# Patient Record
Sex: MALE | Race: WHITE | NOT HISPANIC OR LATINO | Employment: FULL TIME | URBAN - NONMETROPOLITAN AREA
[De-identification: names, ages, dates, MRNs, and addresses within clinical notes are randomized per-mention and may not be internally consistent; named-entity substitution may affect disease eponyms.]

---

## 2023-05-22 DIAGNOSIS — F32.A DEPRESSION, UNSPECIFIED DEPRESSION TYPE: Primary | ICD-10-CM

## 2023-05-22 RX ORDER — FLUOXETINE HYDROCHLORIDE 20 MG/1
CAPSULE ORAL
Qty: 60 CAPSULE | Refills: 3 | Status: SHIPPED | OUTPATIENT
Start: 2023-05-22

## 2023-05-22 RX ORDER — FLUOXETINE HYDROCHLORIDE 20 MG/1
40 CAPSULE ORAL EVERY MORNING
COMMUNITY
Start: 2023-04-18 | End: 2023-07-06

## 2023-07-06 ENCOUNTER — OFFICE VISIT (OUTPATIENT)
Dept: FAMILY MEDICINE | Facility: CLINIC | Age: 23
End: 2023-07-06
Payer: COMMERCIAL

## 2023-07-06 VITALS
DIASTOLIC BLOOD PRESSURE: 74 MMHG | HEIGHT: 72 IN | TEMPERATURE: 99 F | HEART RATE: 85 BPM | OXYGEN SATURATION: 99 % | BODY MASS INDEX: 31.97 KG/M2 | SYSTOLIC BLOOD PRESSURE: 120 MMHG | WEIGHT: 236 LBS

## 2023-07-06 DIAGNOSIS — F41.9 ANXIETY: Primary | ICD-10-CM

## 2023-07-06 DIAGNOSIS — R53.83 FATIGUE, UNSPECIFIED TYPE: ICD-10-CM

## 2023-07-06 LAB
ALBUMIN SERPL-MCNC: 5.1 G/DL (ref 3.4–5)
ALBUMIN/GLOB SERPL: 1.9 RATIO
ALP SERPL-CCNC: 83 UNIT/L (ref 50–144)
ALT SERPL-CCNC: 110 UNIT/L (ref 1–45)
ANION GAP SERPL CALC-SCNC: 7 MEQ/L (ref 2–13)
AST SERPL-CCNC: 66 UNIT/L (ref 17–59)
BASOPHILS # BLD AUTO: 0.04 X10(3)/MCL (ref 0.01–0.08)
BASOPHILS NFR BLD AUTO: 0.7 % (ref 0.1–1.2)
BILIRUBIN DIRECT+TOT PNL SERPL-MCNC: 0.4 MG/DL (ref 0–1)
BUN SERPL-MCNC: 11 MG/DL (ref 7–20)
CALCIUM SERPL-MCNC: 9.8 MG/DL (ref 8.4–10.2)
CHLORIDE SERPL-SCNC: 102 MMOL/L (ref 98–110)
CHOLEST SERPL-MCNC: 182 MG/DL (ref 0–200)
CO2 SERPL-SCNC: 33 MMOL/L (ref 21–32)
CREAT SERPL-MCNC: 0.9 MG/DL (ref 0.66–1.25)
CREAT/UREA NIT SERPL: 12 (ref 12–20)
EOSINOPHIL # BLD AUTO: 0.09 X10(3)/MCL (ref 0.04–0.54)
EOSINOPHIL NFR BLD AUTO: 1.6 % (ref 0.7–7)
ERYTHROCYTE [DISTWIDTH] IN BLOOD BY AUTOMATED COUNT: 13.3 %
GFR SERPLBLD CREATININE-BSD FMLA CKD-EPI: >90 MLS/MIN/1.73/M2
GLOBULIN SER-MCNC: 2.7 GM/DL (ref 2–3.9)
GLUCOSE SERPL-MCNC: 94 MG/DL (ref 70–115)
HCT VFR BLD AUTO: 47.3 % (ref 36–52)
HDLC SERPL-MCNC: 54 MG/DL (ref 40–60)
HGB BLD-MCNC: 16.1 G/DL (ref 13–18)
IMM GRANULOCYTES # BLD AUTO: 0.01 X10(3)/MCL (ref 0–0.03)
IMM GRANULOCYTES NFR BLD AUTO: 0.2 % (ref 0–0.5)
LDLC SERPL DIRECT ASSAY-SCNC: 86 MG/DL (ref 30–100)
LYMPHOCYTES # BLD AUTO: 1.72 X10(3)/MCL (ref 1.32–3.57)
LYMPHOCYTES NFR BLD AUTO: 30.3 % (ref 20–55)
MCH RBC QN AUTO: 29.5 PG (ref 27–34)
MCHC RBC AUTO-ENTMCNC: 34 G/DL (ref 31–37)
MCV RBC AUTO: 86.8 FL (ref 79–99)
MONOCYTES # BLD AUTO: 0.73 X10(3)/MCL (ref 0.3–0.82)
MONOCYTES NFR BLD AUTO: 12.9 % (ref 4.7–12.5)
NEUTROPHILS # BLD AUTO: 3.09 X10(3)/MCL (ref 1.78–5.38)
NEUTROPHILS NFR BLD AUTO: 54.3 % (ref 37–73)
NRBC BLD AUTO-RTO: 0 %
PLATELET # BLD AUTO: 279 X10(3)/MCL (ref 140–371)
PMV BLD AUTO: 10.6 FL (ref 9.4–12.4)
POTASSIUM SERPL-SCNC: 4.5 MMOL/L (ref 3.5–5.1)
PROT SERPL-MCNC: 7.8 GM/DL (ref 6.3–8.2)
RBC # BLD AUTO: 5.45 X10(6)/MCL (ref 4–6)
SODIUM SERPL-SCNC: 142 MMOL/L (ref 135–145)
TRIGL SERPL-MCNC: 124 MG/DL (ref 30–200)
TSH SERPL-ACNC: 0.78 UIU/ML (ref 0.36–3.74)
WBC # SPEC AUTO: 5.68 X10(3)/MCL (ref 4–11.5)

## 2023-07-06 PROCEDURE — 3074F SYST BP LT 130 MM HG: CPT | Mod: CPTII,,, | Performed by: FAMILY MEDICINE

## 2023-07-06 PROCEDURE — 80053 COMPREHEN METABOLIC PANEL: CPT | Performed by: FAMILY MEDICINE

## 2023-07-06 PROCEDURE — 3074F PR MOST RECENT SYSTOLIC BLOOD PRESSURE < 130 MM HG: ICD-10-PCS | Mod: CPTII,,, | Performed by: FAMILY MEDICINE

## 2023-07-06 PROCEDURE — 3008F BODY MASS INDEX DOCD: CPT | Mod: CPTII,,, | Performed by: FAMILY MEDICINE

## 2023-07-06 PROCEDURE — 3008F PR BODY MASS INDEX (BMI) DOCUMENTED: ICD-10-PCS | Mod: CPTII,,, | Performed by: FAMILY MEDICINE

## 2023-07-06 PROCEDURE — 80061 LIPID PANEL: CPT | Performed by: FAMILY MEDICINE

## 2023-07-06 PROCEDURE — 1159F MED LIST DOCD IN RCRD: CPT | Mod: CPTII,,, | Performed by: FAMILY MEDICINE

## 2023-07-06 PROCEDURE — 3078F PR MOST RECENT DIASTOLIC BLOOD PRESSURE < 80 MM HG: ICD-10-PCS | Mod: CPTII,,, | Performed by: FAMILY MEDICINE

## 2023-07-06 PROCEDURE — 99213 OFFICE O/P EST LOW 20 MIN: CPT | Mod: ,,, | Performed by: FAMILY MEDICINE

## 2023-07-06 PROCEDURE — 1159F PR MEDICATION LIST DOCUMENTED IN MEDICAL RECORD: ICD-10-PCS | Mod: CPTII,,, | Performed by: FAMILY MEDICINE

## 2023-07-06 PROCEDURE — 99213 PR OFFICE/OUTPT VISIT, EST, LEVL III, 20-29 MIN: ICD-10-PCS | Mod: ,,, | Performed by: FAMILY MEDICINE

## 2023-07-06 PROCEDURE — 85025 COMPLETE CBC W/AUTO DIFF WBC: CPT | Performed by: FAMILY MEDICINE

## 2023-07-06 PROCEDURE — 3078F DIAST BP <80 MM HG: CPT | Mod: CPTII,,, | Performed by: FAMILY MEDICINE

## 2023-07-06 PROCEDURE — 84443 ASSAY THYROID STIM HORMONE: CPT | Performed by: FAMILY MEDICINE

## 2023-07-06 NOTE — PROGRESS NOTES
SUBJECTIVE:  Robert Oliver is a 23 y.o. male here for Chest Pain and Wrist Pain (Right )      HPI  Patient is here for follow-up.  He has also been having some right wrist pain and occasional chest pains.  The chest pains he describes as a sharp pain mid chest area that only lasts for a few minutes and then goes away.  It tends to be more he is pulling on something heavy.  Wrist pain is in the right wrist.  He denies any injury.  He says the pain also worse when he is pulling on branches is also worried because he has been having a lot of fatigue lately despite getting 6-7 hours of sleep a night  Acostas allergies, medications, history, and problem list were updated as appropriate.    Review of Systems   See HPI  Recent Results (from the past 504 hour(s))   Comprehensive Metabolic Panel    Collection Time: 07/06/23  2:47 PM   Result Value Ref Range    Sodium Level 142 135 - 145 mmol/L    Potassium Level 4.5 3.5 - 5.1 mmol/L    Chloride 102 98 - 110 mmol/L    Carbon Dioxide 33 (H) 21 - 32 mmol/L    Glucose Level 94 70 - 115 mg/dL    Blood Urea Nitrogen 11.0 7.0 - 20.0 mg/dL    Creatinine 0.90 0.66 - 1.25 mg/dL    Calcium Level Total 9.8 8.4 - 10.2 mg/dL    Protein Total 7.8 6.3 - 8.2 gm/dL    Albumin Level 5.1 (H) 3.4 - 5.0 g/dL    Globulin 2.7 2.0 - 3.9 gm/dL    Albumin/Globulin Ratio 1.9 ratio    Bilirubin Total 0.4 0.0 - 1.0 mg/dL    Alkaline Phosphatase 83 50 - 144 unit/L    Alanine Aminotransferase 110 (H) 1 - 45 unit/L    Aspartate Aminotransferase 66 (H) 17 - 59 unit/L    eGFR >90 mls/min/1.73/m2    Anion Gap 7.0 2.0 - 13.0 mEq/L    BUN/Creatinine Ratio 12 12 - 20   TSH    Collection Time: 07/06/23  2:47 PM   Result Value Ref Range    Thyroid Stimulating Hormone 0.775 0.360 - 3.740 uIU/mL   Lipid Panel    Collection Time: 07/06/23  2:47 PM   Result Value Ref Range    Cholesterol Total 182 0 - 200 mg/dL    HDL Cholesterol 54 40 - 60 mg/dL    Triglyceride 124 30 - 200 mg/dL    LDL Cholesterol Direct 86.0 30.0 -  "100.0 mg/dL   CBC with Differential    Collection Time: 07/06/23  2:47 PM   Result Value Ref Range    WBC 5.68 4.00 - 11.50 x10(3)/mcL    RBC 5.45 4.00 - 6.00 x10(6)/mcL    Hgb 16.1 13.0 - 18.0 g/dL    Hct 47.3 36.0 - 52.0 %    MCV 86.8 79.0 - 99.0 fL    MCH 29.5 27.0 - 34.0 pg    MCHC 34.0 31.0 - 37.0 g/dL    RDW 13.3 %    Platelet 279 140 - 371 x10(3)/mcL    MPV 10.6 9.4 - 12.4 fL    Neut % 54.3 37 - 73 %    Lymph % 30.3 20 - 55 %    Mono % 12.9 (H) 4.7 - 12.5 %    Eos % 1.6 0.7 - 7 %    Basophil % 0.7 0.1 - 1.2 %    Lymph # 1.72 1.32 - 3.57 x10(3)/mcL    Neut # 3.09 1.78 - 5.38 x10(3)/mcL    Mono # 0.73 0.3 - 0.82 x10(3)/mcL    Eos # 0.09 0.04 - 0.54 x10(3)/mcL    Baso # 0.04 0.01 - 0.08 x10(3)/mcL    IG# 0.01 0.0001 - 0.031 x10(3)/mcL    IG% 0.2 0 - 0.5 %    NRBC% 0.0 <=1 %       OBJECTIVE:  Vital signs  Vitals:    07/06/23 1414   BP: 120/74   BP Location: Left arm   Patient Position: Sitting   BP Method: Medium (Manual)   Pulse: 85   Temp: 98.9 °F (37.2 °C)   TempSrc: Oral   SpO2: 99%   Weight: 107 kg (236 lb)   Height: 6' 0.24" (1.835 m)        Physical Exam heart with regular rate and rhythm    ASSESSMENT/PLAN:  1. Anxiety  Is doing well without fluoxetine at this time  -     CBC Auto Differential; Future; Expected date: 07/06/2023  -     Comprehensive Metabolic Panel; Future; Expected date: 07/06/2023  -     TSH; Future; Expected date: 07/06/2023  -     Lipid Panel; Future; Expected date: 07/06/2023    2. Fatigue, unspecified type  Check a CBC CMP and TSH  -     CBC Auto Differential; Future; Expected date: 07/06/2023  -     Comprehensive Metabolic Panel; Future; Expected date: 07/06/2023  -     TSH; Future; Expected date: 07/06/2023  -     Lipid Panel; Future; Expected date: 07/06/2023    3. Wrist pain - this could be some ligamentous.  I want him to do is watch for now as there is no history of trauma and if he continues may need to send him to a hand specialist as he does repetitive motion with the right " hand at work    4. Chest pain - reassurance as this sounds like something more muscular     Follow Up:  No follow-ups on file.

## 2023-07-10 ENCOUNTER — TELEPHONE (OUTPATIENT)
Dept: FAMILY MEDICINE | Facility: CLINIC | Age: 23
End: 2023-07-10
Payer: COMMERCIAL

## 2023-07-10 DIAGNOSIS — R53.83 FATIGUE, UNSPECIFIED TYPE: Primary | ICD-10-CM

## 2023-07-10 NOTE — TELEPHONE ENCOUNTER
----- Message from Chino Enciso MD sent at 7/8/2023  2:03 PM CDT -----  Tell him lab work showed a mild elevation of the liver enzymes.  I wonder if he may have had a virus a little while back that is causing his fatigue as these will sometimes elevate the liver enzymes.  I would like to repeat a CBC and a CMP in 2 weeks make sure they are back to normal  ----- Message -----  From: Background User Lab  Sent: 7/6/2023   3:44 PM CDT  To: Chino Enciso MD

## 2024-04-24 ENCOUNTER — PATIENT MESSAGE (OUTPATIENT)
Dept: FAMILY MEDICINE | Facility: CLINIC | Age: 24
End: 2024-04-24
Payer: COMMERCIAL